# Patient Record
Sex: MALE | Race: WHITE
[De-identification: names, ages, dates, MRNs, and addresses within clinical notes are randomized per-mention and may not be internally consistent; named-entity substitution may affect disease eponyms.]

---

## 2019-04-24 ENCOUNTER — HOSPITAL ENCOUNTER (EMERGENCY)
Dept: HOSPITAL 75 - ER FS | Age: 64
Discharge: HOME | End: 2019-04-24
Payer: COMMERCIAL

## 2019-04-24 VITALS — DIASTOLIC BLOOD PRESSURE: 76 MMHG | SYSTOLIC BLOOD PRESSURE: 117 MMHG

## 2019-04-24 VITALS — WEIGHT: 220 LBS | BODY MASS INDEX: 28.23 KG/M2 | HEIGHT: 74 IN

## 2019-04-24 DIAGNOSIS — R07.89: Primary | ICD-10-CM

## 2019-04-24 DIAGNOSIS — Z82.49: ICD-10-CM

## 2019-04-24 DIAGNOSIS — E78.00: ICD-10-CM

## 2019-04-24 DIAGNOSIS — I10: ICD-10-CM

## 2019-04-24 LAB
ALBUMIN SERPL-MCNC: 4.2 GM/DL (ref 3.2–4.5)
ALP SERPL-CCNC: 73 U/L (ref 40–136)
ALT SERPL-CCNC: 13 U/L (ref 0–55)
BILIRUB SERPL-MCNC: 0.3 MG/DL (ref 0.1–1)
BUN/CREAT SERPL: 22
CALCIUM SERPL-MCNC: 9.1 MG/DL (ref 8.5–10.1)
CHLORIDE SERPL-SCNC: 100 MMOL/L (ref 98–107)
CO2 SERPL-SCNC: 20 MMOL/L (ref 21–32)
CREAT SERPL-MCNC: 1.02 MG/DL (ref 0.6–1.3)
ERYTHROCYTE [DISTWIDTH] IN BLOOD BY AUTOMATED COUNT: 13.2 % (ref 10–14.5)
GFR SERPLBLD BASED ON 1.73 SQ M-ARVRAT: > 60 ML/MIN
GLUCOSE SERPL-MCNC: 115 MG/DL (ref 70–105)
HCT VFR BLD CALC: 40 % (ref 40–54)
HGB BLD-MCNC: 13.6 G/DL (ref 13.3–17.7)
MCH RBC QN AUTO: 30 PG (ref 25–34)
MCHC RBC AUTO-ENTMCNC: 34 G/DL (ref 32–36)
MCV RBC AUTO: 89 FL (ref 80–99)
PLATELET # BLD: 219 10^3/UL (ref 130–400)
PMV BLD AUTO: 10.1 FL (ref 7.4–10.4)
POTASSIUM SERPL-SCNC: 4 MMOL/L (ref 3.6–5)
PROT SERPL-MCNC: 6.8 GM/DL (ref 6.4–8.2)
SODIUM SERPL-SCNC: 137 MMOL/L (ref 135–145)
WBC # BLD AUTO: 7.4 10^3/UL (ref 4.3–11)

## 2019-04-24 PROCEDURE — 76775 US EXAM ABDO BACK WALL LIM: CPT

## 2019-04-24 PROCEDURE — 93041 RHYTHM ECG TRACING: CPT

## 2019-04-24 PROCEDURE — 71045 X-RAY EXAM CHEST 1 VIEW: CPT

## 2019-04-24 PROCEDURE — 85027 COMPLETE CBC AUTOMATED: CPT

## 2019-04-24 PROCEDURE — 93005 ELECTROCARDIOGRAM TRACING: CPT

## 2019-04-24 PROCEDURE — 36415 COLL VENOUS BLD VENIPUNCTURE: CPT

## 2019-04-24 PROCEDURE — 80053 COMPREHEN METABOLIC PANEL: CPT

## 2019-04-24 PROCEDURE — 84484 ASSAY OF TROPONIN QUANT: CPT

## 2019-04-24 NOTE — DIAGNOSTIC IMAGING REPORT
INDICATION: Chest pain.



No prior examinations are available for comparison.



FINDINGS: The heart size, mediastinal configuration, and

pulmonary vascularity are within normal limits. There is no

pleural effusion, pneumothorax, or pneumonia. The osseous

structures are unremarkable. 



IMPRESSION: No acute cardiopulmonary abnormality.



Dictated by: 



  Dictated on workstation # [id number]

## 2019-04-24 NOTE — ED CHEST PAIN
General


Chief Complaint:  Chest Pain


Stated Complaint:  CHEST PAIN


Nursing Triage Note:  


Patient states he woke at 0600 today with substernal/epigastric chest pain that 


waxes and wanes, but never completely goes away.


Nursing Sepsis Screen:  No Definite Risk





History of Present Illness


Date Seen by Provider:  Apr 24, 2019


Time Seen by Provider:  08:30


This is a 63-year-old man with a history of hypertension although not currently 

taking antihypertensives, here for substernal nonradiating chest discomfort 

occurring since waking at 6 AM today. It is waxing and waning. Nonexertional. 

Mild to moderate severity at worst, 4 out of 10 in severity states. He tried to 

take medicine for heartburn including Tums, this did not help. He has felt the 

need to belch, hoping that in doing so the discomfort would go away however it 

persisted. He decided to go to work because it was not particularly concerning 

but because it kept coming back he decided to come for an evaluation. He had a 

negative stress test but this was many years ago. His father had heart disease 

diagnosed in his late 50s but he was also a smoker, otherwise no first-degree 

relatives with premature CAD or CVA. Patient is not a smoker, no diabetes, no 

drug use. No paresthesias in the extremities. No shortness of breath. No 

lightheadedness. No nausea or diaphoresis.





Allergies and Home Medications


Allergies


Coded Allergies:  


     No Known Drug Allergies (Unverified , 4/24/19)





Patient Home Medication List


Home Medication List Reviewed:  Yes





Review of Systems


Review of Systems


Constitutional:  no symptoms reported


EENTM:  No Symptoms Reported


Respiratory:  No Symptoms Reported


Cardiovascular:  See HPI


Gastrointestinal:  See HPI


Genitourinary:  No Symptoms Reported


Musculoskeletal:  no symptoms reported


Skin:  no symptoms reported


Psychiatric/Neurological:  No Symptoms Reported


Endocrine:  No Symptoms Reported


Hematologic/Lymphatic:  No Symptoms Reported





Past Medical-Social-Family Hx


Past Med/Social Hx:  Reviewed Nursing Past Med/Soc Hx


Patient Social History


Alcohol Use:  Occasionally Uses


Recreational Drug Use:  No


Smoking Status:  Never a Smoker


2nd Hand Smoke Exposure:  No


Recent Foreign Travel:  No


Contact w/Someone Who Travel:  No


Recent Infectious Disease Expo:  No


Recent Hopitalizations:  No


Physical Abuse:  No


Sexual Abuse:  No


Mistreated:  No


Fear:  No





Seasonal Allergies


Seasonal Allergies:  No





Past Medical History


Surgeries:  Yes


Eye Surgery


Respiratory:  No


Cardiac:  Yes


High Cholesterol, Hypertension


Neurological:  No


Genitourinary:  No


Gastrointestinal:  No


Musculoskeletal:  No


Endocrine:  No


HEENT:  Yes (right eye)


Eye Injury


Psychosocial:  No


Integumentary:  No


Blood Disorders:  No





Physical Exam


Vital Signs





Vital Signs - First Documented




















Capillary Refill : Less Than 3 Seconds


Height, Weight, BMI


Height: 6'2.00"


Weight: 220lbs. oz. 99.333952bw;  BMI


Method:Stated


General Appearance:  No Apparent Distress


HEENT:  Moist Mucous Membranes, Other (wearing an eye patch over the right eye 

secondary to mild photophobia related to recent attempted lens transplant 

procedure)


Neck:  Supple


Respiratory:  Lungs Clear


Cardiovascular:  Regular Rate, Rhythm, Normal Peripheral Pulses


Gastrointestinal:  No Pulsatile Mass, Non Tender, Soft


Neurologic/Psychiatric:  Alert, Oriented x3, No Motor/Sensory Deficits, Normal 

Mood/Affect


Skin:  Warm/Dry





Progress/Results/Core Measures


Results/Orders


Lab Results





Laboratory Tests








Test


 4/24/19


08:20 4/24/19


11:00 Range/Units


 


 


White Blood Count


 7.4 


 


 4.3-11.0


10^3/uL


 


Red Blood Count


 4.52 


 


 4.35-5.85


10^6/uL


 


Hemoglobin 13.6   13.3-17.7  G/DL


 


Hematocrit 40   40-54  %


 


Mean Corpuscular Volume 89   80-99  FL


 


Mean Corpuscular Hemoglobin 30   25-34  PG


 


Mean Corpuscular Hemoglobin


Concent 34 


 


 32-36  G/DL





 


Red Cell Distribution Width 13.2   10.0-14.5  %


 


Platelet Count


 219 


 


 130-400


10^3/uL


 


Mean Platelet Volume 10.1   7.4-10.4  FL


 


Sodium Level 137   135-145  MMOL/L


 


Potassium Level 4.0   3.6-5.0  MMOL/L


 


Chloride Level 100     MMOL/L


 


Carbon Dioxide Level 20 L  21-32  MMOL/L


 


Anion Gap 17 H  5-14  MMOL/L


 


Blood Urea Nitrogen 22 H  7-18  MG/DL


 


Creatinine


 1.02 


 


 0.60-1.30


MG/DL


 


Estimat Glomerular Filtration


Rate > 60 


 


  





 


BUN/Creatinine Ratio 22    


 


Glucose Level 115 H    MG/DL


 


Calcium Level 9.1   8.5-10.1  MG/DL


 


Corrected Calcium 8.9   8.5-10.1  MG/DL


 


Total Bilirubin 0.3   0.1-1.0  MG/DL


 


Aspartate Amino Transf


(AST/SGOT) 12 


 


 5-34  U/L





 


Alanine Aminotransferase


(ALT/SGPT) 13 


 


 0-55  U/L





 


Alkaline Phosphatase 73     U/L


 


Troponin T 8  7  <=15  NG/L


 


Total Protein 6.8   6.4-8.2  GM/DL


 


Albumin 4.2   3.2-4.5  GM/DL








My Orders





Orders - VANDANA JACOBS T DO


Chest 1 View Ap/Pa Only (4/24/19 08:47)


Ekg Tracing (4/24/19 08:47)


Comprehensive Metabolic Panel (4/24/19 08:47)


O2 (4/24/19 08:47)


Monitor-Rhythm Ecg Trace Only (4/24/19 08:47)


Aspirin Chewable Tablet (Baby Aspirin Ch (4/24/19 09:00)


Nitroglycerin 0.4 Mg Btl 25's (Nitrostat (4/24/19 09:00)


Ed Iv/Invasive Line Start (4/24/19 08:47)


Cbc No Diff (4/24/19 08:47)


Troponin T (4/24/19 08:47)


Lidocaine 2% Viscous 15 Ml (Xylocaine Vi (4/24/19 09:45)


Antacid  Suspension (Mylanta  Suspension (4/24/19 09:45)


Us Aorta 81700 (4/24/19 09:54)


Troponin T (4/24/19 10:20)





Medications Given in ED





Current Medications








 Medications  Dose


 Ordered  Sig/Janice


 Route  Start Time


 Stop Time Status Last Admin


Dose Admin


 


 Al Hydrox/Mg


 Hydrox/Simethicone  30 ml  ONCE  ONCE


 PO  4/24/19 09:45


 4/24/19 09:46 DC 4/24/19 09:45


30 ML


 


 Aspirin  324 mg  ONCE  ONCE


 PO  4/24/19 09:00


 4/24/19 09:01 DC 4/24/19 08:54


324 MG


 


 Lidocaine HCl  30 ml  ONCE  ONCE


 PO  4/24/19 09:45


 4/24/19 09:46 DC 4/24/19 09:45


30 ML


 


 Nitroglycerin  0.4 mg  UD  PRN


 SL  4/24/19 09:00


    4/24/19 08:54


0.4 MG








Vital Signs/I&O











 4/24/19 4/24/19





 08:13 08:13


 


Temp 97.6 


 


Pulse 62 


 


Resp 18 


 


B/P (MAP) 132/76 (94) 


 


Pulse Ox 98 


 


O2 Delivery Room Air Room Air














Blood Pressure Mean:  94











Progress


Progress Note #1:  


Progress Note


This is a 63-year-old man with waxing and waning nonexertional nonradiating 

chest discomfort with no associated symptoms. Assuming initial troponin is 

negative his heart score is 3, including nonspecific T-wave flattening on ECG, 

age, father having heart disease before age of 65, and including a history of 

hypertension.  His discomfort he states resolved after arriving to the 

emergency department. We will continue to monitor. We will give a full dose of 

aspirin, he took 81 mg this morning. I have offered admission for monitoring 

and stress test as an inpatient, I also discussed the possibility of discharge 

assuming remaining workup is negative and patient remains asymptomatic, with 

prompt outpatient cardiology follow-up, and we will make a disposition decision 

using shared decision making.


Progress Note #2:  


Progress Note


I did an abdominal aortic ultrasound given the lower chest/epigastric location 

of patient's discomfort, despite her not being epigastric tenderness or 

palpable mass. Patient remained feeling better, improved with a GI cocktail 

which suggests GI etiology however I still feel it is reasonable that he follow 

up with cardiology for consideration of outpatient stress testing, I 

recommended that he call today or tomorrow for the soonest available 

appointment. He will return for any new or worsening symptoms.


EKG :  


Comment


0812: Sinus justine rate 59.  Nonspecific T-wave flattening in aVL, V2, V3.  

Delayed precordial R-wave progression.





Departure


Impression





 Primary Impression:  


 Chest pain


Disposition:  01 HOME, SELF-CARE


Condition:  Stable





Departure-Patient Inst.


Referrals:  


MARY LOU SQUIRES MD (PCP/Family)


Primary Care Physician








REE OLIVAS MD FACP FAC CCDS


Patient Instructions:  Chest Pain (DC)











VANDANA JACOBS DO Apr 24, 2019 08:58

## 2019-04-24 NOTE — DIAGNOSTIC IMAGING REPORT
Indication: Chest pain.



Study is limited due to overlying bowel gas. Proximal aorta is

2.2 x 2.2 cm. Mid aorta is 2.4 x 2.5 cm. Distal aorta is 2.0 x

2.2 cm. The iliacs were obscured by bowel gas.



Impression: No evidence of abdominal aortic aneurysm.



Dictated by: 



  Dictated on workstation # [id number]

## 2019-07-13 ENCOUNTER — HOSPITAL ENCOUNTER (EMERGENCY)
Dept: HOSPITAL 75 - ER FS | Age: 64
Discharge: HOME | End: 2019-07-13
Payer: COMMERCIAL

## 2019-07-13 VITALS — BODY MASS INDEX: 28.23 KG/M2 | WEIGHT: 220 LBS | HEIGHT: 74 IN

## 2019-07-13 VITALS — DIASTOLIC BLOOD PRESSURE: 80 MMHG | SYSTOLIC BLOOD PRESSURE: 114 MMHG

## 2019-07-13 DIAGNOSIS — E78.00: ICD-10-CM

## 2019-07-13 DIAGNOSIS — I10: ICD-10-CM

## 2019-07-13 DIAGNOSIS — R42: Primary | ICD-10-CM

## 2019-07-13 LAB
ALBUMIN SERPL-MCNC: 3.9 GM/DL (ref 3.2–4.5)
ALP SERPL-CCNC: 77 U/L (ref 40–136)
ALT SERPL-CCNC: 17 U/L (ref 0–55)
BASOPHILS # BLD AUTO: 0 10^3/UL (ref 0–0.1)
BASOPHILS NFR BLD AUTO: 0 % (ref 0–10)
BILIRUB SERPL-MCNC: 0.6 MG/DL (ref 0.1–1)
BUN/CREAT SERPL: 14
CALCIUM SERPL-MCNC: 9.3 MG/DL (ref 8.5–10.1)
CHLORIDE SERPL-SCNC: 102 MMOL/L (ref 98–107)
CO2 SERPL-SCNC: 26 MMOL/L (ref 21–32)
CREAT SERPL-MCNC: 1.2 MG/DL (ref 0.6–1.3)
EOSINOPHIL # BLD AUTO: 0.4 10^3/UL (ref 0–0.3)
EOSINOPHIL NFR BLD AUTO: 6 % (ref 0–10)
ERYTHROCYTE [DISTWIDTH] IN BLOOD BY AUTOMATED COUNT: 13.3 % (ref 10–14.5)
GFR SERPLBLD BASED ON 1.73 SQ M-ARVRAT: > 60 ML/MIN
GLUCOSE SERPL-MCNC: 163 MG/DL (ref 70–105)
HCT VFR BLD CALC: 42 % (ref 40–54)
HGB BLD-MCNC: 14.3 G/DL (ref 13.3–17.7)
LYMPHOCYTES # BLD AUTO: 1.2 X 10^3 (ref 1–4)
LYMPHOCYTES NFR BLD AUTO: 17 % (ref 12–44)
MAGNESIUM SERPL-MCNC: 1.9 MG/DL (ref 1.8–2.4)
MANUAL DIFFERENTIAL PERFORMED BLD QL: NO
MCH RBC QN AUTO: 30 PG (ref 25–34)
MCHC RBC AUTO-ENTMCNC: 34 G/DL (ref 32–36)
MCV RBC AUTO: 88 FL (ref 80–99)
MONOCYTES # BLD AUTO: 0.5 X 10^3 (ref 0–1)
MONOCYTES NFR BLD AUTO: 8 % (ref 0–12)
NEUTROPHILS # BLD AUTO: 4.8 X 10^3 (ref 1.8–7.8)
NEUTROPHILS NFR BLD AUTO: 69 % (ref 42–75)
PLATELET # BLD: 200 10^3/UL (ref 130–400)
PMV BLD AUTO: 9.5 FL (ref 7.4–10.4)
POTASSIUM SERPL-SCNC: 4.3 MMOL/L (ref 3.6–5)
PROT SERPL-MCNC: 6.4 GM/DL (ref 6.4–8.2)
SODIUM SERPL-SCNC: 140 MMOL/L (ref 135–145)
WBC # BLD AUTO: 7.1 10^3/UL (ref 4.3–11)

## 2019-07-13 PROCEDURE — 96374 THER/PROPH/DIAG INJ IV PUSH: CPT

## 2019-07-13 PROCEDURE — 70450 CT HEAD/BRAIN W/O DYE: CPT

## 2019-07-13 PROCEDURE — 36415 COLL VENOUS BLD VENIPUNCTURE: CPT

## 2019-07-13 PROCEDURE — 83735 ASSAY OF MAGNESIUM: CPT

## 2019-07-13 PROCEDURE — 70498 CT ANGIOGRAPHY NECK: CPT

## 2019-07-13 PROCEDURE — 84484 ASSAY OF TROPONIN QUANT: CPT

## 2019-07-13 PROCEDURE — 85025 COMPLETE CBC W/AUTO DIFF WBC: CPT

## 2019-07-13 PROCEDURE — 80053 COMPREHEN METABOLIC PANEL: CPT

## 2019-07-13 PROCEDURE — 70496 CT ANGIOGRAPHY HEAD: CPT

## 2019-07-13 PROCEDURE — 93005 ELECTROCARDIOGRAM TRACING: CPT

## 2019-07-13 NOTE — ED GENERAL
General


Chief Complaint:  Dizziness/Syncope


Stated Complaint:  DIZZINESS


Nursing Triage Note:  


Patient reports that he was laying in bed and blew his nose and then the room 


began to spin and he became very diaphoretic. Patient reports that he was 


nauseated and the dizziness is much worse with movement.


Nursing Sepsis Screen:  No Definite Risk


Source of Information:  Patient


Exam Limitations:  No Limitations





History of Present Illness


Date Seen by Provider:  Jul 13, 2019


Time Seen by Provider:  10:35


Initial Comments


Patient blew his nose while laying in bed and became very vertiginous (room was 

spinning).  He became extremely diaphoretic and nauseated.  Symptoms have impro

brandon somewhat.  This occurred 30 minutes ago.  He is still dizzy with any 

movement.  No chest pain.  No headache.





Allergies and Home Medications


Allergies


Coded Allergies:  


     No Known Drug Allergies (Unverified , 4/24/19)





Home Medications


Meclizine HCl 25 Mg Tablet, 25 MG PO QID PRN for DIZZINESS


   Prescribed by: GARY JUAN on 7/13/19 1326


Olmesartan/Hydrochlorothiazide 1 Each Tablet, 1 EACH PO DAILY, (Reported)





Patient Home Medication List


Home Medication List Reviewed:  Yes





Review of Systems


Review of Systems


Constitutional:  no symptoms reported


EENTM:  nose congestion


Respiratory:  no symptoms reported


Cardiovascular:  no symptoms reported


Musculoskeletal:  no symptoms reported


Psychiatric/Neurological:  See HPI; Denies Headache, Denies Numbness, Denies 

Paresthesia, Denies Seizure





All Other Systems Reviewed


Negative Unless Noted:  Yes





Past Medical-Social-Family Hx


Patient Social History


Alcohol Use:  Denies Use


Recreational Drug Use:  No


Smoking Status:  Never a Smoker


2nd Hand Smoke Exposure:  No


Recent Foreign Travel:  No


Contact w/Someone Who Travel:  No


Recent Infectious Disease Expo:  No


Recent Hopitalizations:  No


Physical Abuse:  No


Sexual Abuse:  No





Seasonal Allergies


Seasonal Allergies:  No





Past Medical History


Surgeries:  Yes


Eye Surgery


Respiratory:  No


Cardiac:  Yes


High Cholesterol, Hypertension


Neurological:  No


Genitourinary:  No


Gastrointestinal:  No


Musculoskeletal:  No


Endocrine:  No


HEENT:  Yes (right eye)


Eye Injury


Psychosocial:  No


Integumentary:  No


Blood Disorders:  No





Physical Exam


Vital Signs





Vital Signs - First Documented








 7/13/19





 10:23


 


Temp 95.6


 


Pulse 57


 


Resp 14


 


B/P (MAP) 124/78 (93)


 


Pulse Ox 97


 


O2 Delivery Room Air





Capillary Refill : Less Than 3 Seconds


Height, Weight, BMI


Height: 6'2.00"


Weight: 220lbs. oz. 99.903615wf;  BMI


Method:Stated


General Appearance:  No Apparent Distress, WD/WN


Eyes:  Right Eye Other (right pupil 5 mm and nonreactive (postsurgical).  Left 

pupil reactive)


HEENT:  Normal ENT Inspection, Pharynx Normal


Neck:  Full Range of Motion, Normal Inspection, Supple; No Carotid Bruit


Respiratory:  Lungs Clear, Normal Breath Sounds


Cardiovascular:  Regular Rate, Rhythm, No Edema


Gastrointestinal:  Normal Bowel Sounds, No Organomegaly


Extremity:  Normal Inspection, Normal Range of Motion


Neurologic/Psychiatric:  Alert, Oriented x3, No Motor/Sensory Deficits, Normal 

Mood/Affect; No Motor Weakness, No Sensory Deficit


Skin:  Normal Color, Damp





Progress/Results/Core Measures


Suspected Sepsis


Recent Fever Within 48 Hours:  No


Infection Criteria Present:  None


New/Unexplained  Altered Menta:  No


Sepsis Screen:  No Definite Risk


SIRS


Temperature:95.6 


Pulse: 57 


Respiratory Rate: 14


 


Laboratory Tests


7/13/19 10:20: White Blood Count 7.1


Blood Pressure 124 /78 


Mean: 93


 


Laboratory Tests


7/13/19 10:20: 


Creatinine 1.20, Platelet Count 200, Total Bilirubin 0.6








Results/Orders


Lab Results





Laboratory Tests








Test


 7/13/19


10:20 Range/Units


 


 


White Blood Count


 7.1 


 4.3-11.0


10^3/uL


 


Red Blood Count


 4.79 


 4.35-5.85


10^6/uL


 


Hemoglobin 14.3  13.3-17.7  G/DL


 


Hematocrit 42  40-54  %


 


Mean Corpuscular Volume 88  80-99  FL


 


Mean Corpuscular Hemoglobin 30  25-34  PG


 


Mean Corpuscular Hemoglobin


Concent 34 


 32-36  G/DL





 


Red Cell Distribution Width 13.3  10.0-14.5  %


 


Platelet Count


 200 


 130-400


10^3/uL


 


Mean Platelet Volume 9.5  7.4-10.4  FL


 


Neutrophils (%) (Auto) 69  42-75  %


 


Lymphocytes (%) (Auto) 17  12-44  %


 


Monocytes (%) (Auto) 8  0-12  %


 


Eosinophils (%) (Auto) 6  0-10  %


 


Basophils (%) (Auto) 0  0-10  %


 


Neutrophils # (Auto) 4.8  1.8-7.8  X 10^3


 


Lymphocytes # (Auto) 1.2  1.0-4.0  X 10^3


 


Monocytes # (Auto) 0.5  0.0-1.0  X 10^3


 


Eosinophils # (Auto)


 0.4 H


 0.0-0.3


10^3/uL


 


Basophils # (Auto)


 0.0 


 0.0-0.1


10^3/uL


 


Sodium Level 140  135-145  MMOL/L


 


Potassium Level 4.3  3.6-5.0  MMOL/L


 


Chloride Level 102    MMOL/L


 


Carbon Dioxide Level 26  21-32  MMOL/L


 


Anion Gap 12  5-14  MMOL/L


 


Blood Urea Nitrogen 17  7-18  MG/DL


 


Creatinine


 1.20 


 0.60-1.30


MG/DL


 


Estimat Glomerular Filtration


Rate > 60 


  





 


BUN/Creatinine Ratio 14   


 


Glucose Level 163 H   MG/DL


 


Calcium Level 9.3  8.5-10.1  MG/DL


 


Corrected Calcium 9.4  8.5-10.1  MG/DL


 


Magnesium Level 1.9  1.8-2.4  MG/DL


 


Total Bilirubin 0.6  0.1-1.0  MG/DL


 


Aspartate Amino Transf


(AST/SGOT) 18 


 5-34  U/L





 


Alanine Aminotransferase


(ALT/SGPT) 17 


 0-55  U/L





 


Alkaline Phosphatase 77    U/L


 


Troponin I < 0.30  <0.30  NG/ML


 


Total Protein 6.4  6.4-8.2  GM/DL


 


Albumin 3.9  3.2-4.5  GM/DL








My Orders





Orders - GARY JUAN MD


Cbc With Automated Diff (7/13/19 10:28)


Comprehensive Metabolic Panel (7/13/19 10:28)


Magnesium (7/13/19 10:28)


Ekg Tracing (7/13/19 10:28)


Troponin I (7/13/19 10:33)


Ondansetron Injection (Zofran Injectio (7/13/19 10:45)


Meclizine Tablet (Antivert Tablet) (7/13/19 10:45)


Ct Head Wo (7/13/19 10:33)


Ct Angio Head/Neck (7/13/19 11:06)


Iohexol Injection (Omnipaque 350 Mg/Ml 1 (7/13/19 11:15)


Received Contrast (Hold Metformin- Contr (7/13/19 11:15)


Sodium Chloride Flush (Catheter Flush Sy (7/13/19 11:15)


Ns (Ivpb) (Sodium Chloride 0.9% Ivpb Bag (7/13/19 11:15)





Medications Given in ED





Current Medications








 Medications  Dose


 Ordered  Sig/Janice


 Route  Start Time


 Stop Time Status Last Admin


Dose Admin


 


 Iohexol  100 ml  ONCE  ONCE


 IV  7/13/19 11:15


 7/13/19 11:16 DC 7/13/19 12:10


75 ML


 


 Meclizine HCl  25 mg  ONCE  ONCE


 PO  7/13/19 10:45


 7/13/19 10:46 DC 7/13/19 11:18


25 MG


 


 Ondansetron HCl  4 mg  ONCE  ONCE


 IVP  7/13/19 10:45


 7/13/19 10:46 DC 7/13/19 11:00


4 MG


 


 Sodium Chloride  10 ml  AS NEEDED  PRN


 IV  7/13/19 11:15


 7/13/19 13:45 DC 7/13/19 12:10


10 ML


 


 Sodium Chloride  100 ml  ONCE  ONCE


 IV  7/13/19 11:15


 7/13/19 11:16 DC 7/13/19 12:10


80 ML








Vital Signs/I&O











 7/13/19 7/13/19





 10:23 13:33


 


Temp 95.6 


 


Pulse 57 59


 


Resp 14 19


 


B/P (MAP) 124/78 (93) 114/80 (91)


 


Pulse Ox 97 95


 


O2 Delivery Room Air 





Capillary Refill : Less Than 3 Seconds








Blood Pressure Mean:                    93








Progress Note :  


   Time:  13:23


Progress Note


Test results discussed the patient.  He feels much better.  Currently 

asymptomatic.  CTA of head and neck was normal.  Suspect peripheral vertigo.  

Prescribe Antivert





ECG


Initial ECG Impression Time:  10:39


Initial ECG Rhythm:  S.Aden


Initial ECG Intervals:  Normal


Initial ECG Impression:  Normal





Departure


Impression





   Primary Impression:  


   Vertigo


Disposition:  01 HOME, SELF-CARE


Condition:  Stable





Departure-Patient Inst.


Decision time for Depature:  13:24


Referrals:  


MARY LOU SQUIRES MD (PCP/Family)


Primary Care Physician


Patient Instructions:  Vertigo (a Type of Dizziness) (DC)





Add. Discharge Instructions:  


Rest today and tomorrow.  No sudden movements.  Meclizine for symptoms.  See 

your doctor next week for follow-up.  All discharge instructions reviewed with 

patient and/or family. Voiced understanding.


Scripts


Meclizine HCl (Meclizine HCl) 25 Mg Tablet


25 MG PO QID PRN for DIZZINESS, #20 TAB


   Prov: GARY JUAN MD         7/13/19











GARY JUAN MD              Jul 13, 2019 10:40

## 2019-07-13 NOTE — NUR
Patient up to bedside commode at this time. Patient transferred self with stand 
by assist. denies nausea or dizziness at this time.

## 2019-07-13 NOTE — DIAGNOSTIC IMAGING REPORT
PROCEDURE: CT angiography of the head and CT angiography of the

neck with and without contrast.



TECHNIQUE: Contiguous noncontrast images were obtained from the

skull base through the vertex. After intravenous contrast

administration, helical CT angiography of the neck was performed.

Source data was reformatted into multiple MIP projections.

Delayed post contrast acquisition was also obtained. Auto

Exposure Controls were utilized during the CT exam to meet ALARA

standards for radiation dose reduction. 



INDICATION: Dizziness. Diaphoretic. Nausea.



FINDINGS: There are normal origins of the brachiocephalic

vessels. The carotid arteries are widely patent bilaterally with

no significant stenosis or vessel irregularity is seen. No

dissection is evident. The vertebral arteries are patent with no

stenosis or dissection evident.



The distal internal carotid and vertebral arteries are patent as

is the basilar artery. There is no evidence for aneurysm, AVM,

neovascularity or major vessel occlusion.



Ventricles are normal in size, shape and position. There is no

acute parenchymal hemorrhage, edema or mass. There is no

extra-axial mass or hemorrhage. There is no abnormal enhancement.



IMPRESSION: CT angiography of the head and neck shows no

significant abnormality with no hemodynamically significant

stenoses seen. There are no dissections. No occlusions are seen.



Dictated by: 



  Dictated on workstation # [id number]

## 2019-07-13 NOTE — DIAGNOSTIC IMAGING REPORT
PROCEDURE: CT head without contrast.



TECHNIQUE: Multiple contiguous axial images were obtained through

the brain without the use of intravenous contrast. Auto Exposure

Controls were utilized during the CT exam to meet ALARA standards

for radiation dose reduction. 



INDICATION: Vertigo, nausea, dizziness, diaphoresis.



COMPARISON: None.



FINDINGS:



The ventricles and cortical sulci appear age-appropriate. There

is no midline shift or mass effect. No acute intracranial

hemorrhage is seen. There is no CT evidence of acute territorial

ischemia. The calvarium appears intact. There is mucosal

thickening in the bilateral ethmoid sinuses with a small fluid

level in the left maxillary sinus; however, the paranasal sinuses

are incompletely included on this exam.



IMPRESSION:

1. No acute intracranial hemorrhage or CT evidence of acute

territorial ischemia.

2. Mucosal thickening and fluid level in the paranasal sinuses,

may be due to sinusitis.



Dictated by: 



  Dictated on workstation # [id number]

## 2019-07-13 NOTE — XMS REPORT
Continuity of Care Document

                             Created on: 2019



JED BRAGG

External Reference #: [id number]

: 1955

Sex: Male



Demographics







                          Address                   [address] [place] [address]  [id number]

 

                          Home Phone                [phone] x

 

                          Preferred Language        Unknown

 

                          Marital Status            Unknown

 

                          Yarsanism Affiliation     Unknown

 

                          Race                      Unknown

 

                          Ethnic Group              Unknown





Author







                          Organization              Unknown

 

                          Address                   Unknown

 

                          Phone                     [phone]



              



Allergies

      





             Active              Description              Code              Type              Severity

                Reaction              Onset              Reported/Identified              Relationship

 to Patient                             Clinical Status        

 

                Yes              No Known Drug Allergies              [id number]              Drug Allergy

              Unknown              N/A                             2019              

                                                 



                  



Medications

      



There is no data.                  



Problems

      





             Date Dx Coded              Attending              Type              Code              Diagnosis

                                        Diagnosed By        

 

                2019              REYNALDO BINGHAM, VANDANA T              Ot              E78.00        

                          PURE HYPERCHOLESTEROLEMIA, UNSPECIFIED                       

 

                2019              REYNALDO BINGHAM, VANDANA T              Ot              I10           

                          ESSENTIAL (PRIMARY) HYPERTENSION                       

 

                2019              REYNALDO BINGHAM, VANDANA T              Ot              R07.81        

                          PLEURODYNIA                        

 

                2019              REYNALDO BINGHAM, VANDANA T              Ot              R07.89        

                          OTHER CHEST PAIN                       

 

                2019              REYNALDO BINGHAM, VANDANA T              Ot              Z82.49        

                          FAMILY HX OF ISCHEM HEART DIS AND OTH DI                       

 

                2019              REYNALDO BINGHAM, VANDANA T              Ot              E78.00        

                          PURE HYPERCHOLESTEROLEMIA, UNSPECIFIED                       

 

                2019              REYNALDO BINGHAM, VANDANA T              Ot              I10           

                          ESSENTIAL (PRIMARY) HYPERTENSION                       

 

                2019              REYNALDO BINGHAM, VANDANA T              Ot              R07.81        

                          PLEURODYNIA                        

 

                2019              REYNALDO BINGHAM, VANDANA T              Ot              R07.89        

                          OTHER CHEST PAIN                       

 

                2019              REYNALDO BINGHAM, VANDANA T              Ot              Z82.49        

                          FAMILY HX OF ISCHEM HEART DIS AND OTH DI                       

 

                2019              REYNALDO BINGHAM, VANDANA T              Ot              E78.00        

                          PURE HYPERCHOLESTEROLEMIA, UNSPECIFIED                       

 

                2019              REYNALDO BINGHAM, VANDANA T              Ot              I10           

                          ESSENTIAL (PRIMARY) HYPERTENSION                       

 

                2019              REYNALDO BINGHAM, VANDANA T              Ot              R07.81        

                          PLEURODYNIA                        

 

                2019              REYNALDO BINGHAM, VANDANA T              Ot              R07.89        

                          OTHER CHEST PAIN                       

 

                2019              REYNALDO BINGHAM, VANDANA T              Ot              Z82.49        

                          FAMILY HX OF ISCHEM HEART DIS AND OTH DI                       



                                              



Procedures

      



There is no data.                  



Results

      





                    Test                Result              Range        









                                        Automated blood complete blood count (hemogram) panel - 19 08:20         









                          Blood leukocytes automated count (number/volume)              7.4 10*3/uL         

                                        4.3-11.0        

 

                          Blood erythrocytes automated count (number/volume)              4.52 10*6/uL      

                                        4.35-5.85        

 

                          Venous blood hemoglobin measurement (mass/volume)              13.6 g/dL          

                                        13.3-17.7        

 

                    Blood hematocrit (volume fraction)              40 %                40-54        

 

                    Automated erythrocyte mean corpuscular volume              89 [foz_us]              

80-99        

 

                                        Automated erythrocyte mean corpuscular hemoglobin (mass per erythrocyte)        

                          30 pg                     25-34        

 

                                        Automated erythrocyte mean corpuscular hemoglobin concentration measurement (mass/volume)

                          34 g/dL                   32-36        

 

                    Automated erythrocyte distribution width ratio              13.2 %              10.0-

14.5        

 

                    Automated blood platelet count (count/volume)              219 10*3/uL              

130-400        

 

                          Automated blood platelet mean volume measurement              10.1 [foz_us]       

                                        7.4-10.4        









                                        Comprehensive metabolic panel - 19 08:20         









                          Serum or plasma sodium measurement (moles/volume)              137 mmol/L         

                                        135-145        

 

                          Serum or plasma potassium measurement (moles/volume)              4.0 mmol/L      

                                        3.6-5.0        

 

                          Serum or plasma chloride measurement (moles/volume)              100 mmol/L       

                                                

 

                    Carbon dioxide              20 mmol/L              21-32        

 

                          Serum or plasma anion gap determination (moles/volume)              17 mmol/L     

                                        5-14        

 

                          Serum or plasma urea nitrogen measurement (mass/volume)              22 mg/dL     

                                        7-18        

 

                          Serum or plasma creatinine measurement (mass/volume)              1.02 mg/dL      

                                        0.60-1.30        

 

                    Serum or plasma urea nitrogen/creatinine mass ratio              22                  NRG

        

 

                                        Serum or plasma creatinine measurement with calculation of estimated glomerular 

filtration rate              >                         NRG        

 

                          Serum or plasma glucose measurement (mass/volume)              115 mg/dL          

                                                

 

                          Serum or plasma calcium measurement (mass/volume)              9.1 mg/dL          

                                        8.5-10.1        

 

                          Serum or plasma total bilirubin measurement (mass/volume)              0.3 mg/dL  

                                        0.1-1.0        

 

                                        Serum or plasma alkaline phosphatase measurement (enzymatic activity/volume)    

                          73 U/L                            

 

                                        Serum or plasma aspartate aminotransferase measurement (enzymatic activity/volume)

                          12 U/L                    5-34        

 

                                        Serum or plasma alanine aminotransferase measurement (enzymatic activity/volume)

                          13 U/L                    0-55        

 

                          Serum or plasma protein measurement (mass/volume)              6.8 g/dL           

                                        6.4-8.2        

 

                          Serum or plasma albumin measurement (mass/volume)              4.2 g/dL           

                                        3.2-4.5        

 

                    CALCIUM CORRECTED              8.9 mg/dL              8.5-10.1        









                                        TROPONIN T - 19 08:20         









                    TROPONIN T              8 %                 <=15        









                                        TROPONIN T - 19 11:00         









                    TROPONIN T              7 %                 <=15        



                      



Encounters

      





                ACCT No.              Visit Date/Time              Discharge              Status      

                Pt. Type              Provider              Facility              Loc./Unit      

                                        Complaint        

 

                    [id number]              2019 08:11:00              2019 11:50:00      

                [facility]              Emergency              VANDANA JACOBS DO              [facility]              ER FS                     CHEST PAIN

## 2021-11-30 ENCOUNTER — HOSPITAL ENCOUNTER (OUTPATIENT)
Dept: HOSPITAL 75 - PREOP | Age: 66
Discharge: HOME | End: 2021-11-30
Attending: SURGERY
Payer: MEDICARE

## 2021-11-30 VITALS — BODY MASS INDEX: 31.69 KG/M2 | HEIGHT: 74.02 IN | WEIGHT: 246.92 LBS

## 2021-11-30 DIAGNOSIS — Z01.818: Primary | ICD-10-CM

## 2021-12-03 ENCOUNTER — HOSPITAL ENCOUNTER (OUTPATIENT)
Dept: HOSPITAL 75 - LAB FS | Age: 66
End: 2021-12-03
Attending: SURGERY
Payer: MEDICARE

## 2021-12-03 DIAGNOSIS — R13.10: ICD-10-CM

## 2021-12-03 DIAGNOSIS — Z01.812: Primary | ICD-10-CM

## 2021-12-03 DIAGNOSIS — Z20.822: ICD-10-CM

## 2021-12-03 PROCEDURE — 87635 SARS-COV-2 COVID-19 AMP PRB: CPT

## 2021-12-06 ENCOUNTER — HOSPITAL ENCOUNTER (OUTPATIENT)
Dept: HOSPITAL 75 - ENDO | Age: 66
Discharge: HOME | End: 2021-12-06
Attending: SURGERY
Payer: COMMERCIAL

## 2021-12-06 VITALS — WEIGHT: 246.92 LBS | HEIGHT: 74.02 IN | BODY MASS INDEX: 31.69 KG/M2

## 2021-12-06 VITALS — SYSTOLIC BLOOD PRESSURE: 105 MMHG | DIASTOLIC BLOOD PRESSURE: 64 MMHG

## 2021-12-06 VITALS — SYSTOLIC BLOOD PRESSURE: 133 MMHG | DIASTOLIC BLOOD PRESSURE: 87 MMHG

## 2021-12-06 VITALS — DIASTOLIC BLOOD PRESSURE: 55 MMHG | SYSTOLIC BLOOD PRESSURE: 103 MMHG

## 2021-12-06 VITALS — SYSTOLIC BLOOD PRESSURE: 115 MMHG | DIASTOLIC BLOOD PRESSURE: 58 MMHG

## 2021-12-06 VITALS — DIASTOLIC BLOOD PRESSURE: 58 MMHG | SYSTOLIC BLOOD PRESSURE: 115 MMHG

## 2021-12-06 DIAGNOSIS — K44.9: ICD-10-CM

## 2021-12-06 DIAGNOSIS — K20.90: ICD-10-CM

## 2021-12-06 DIAGNOSIS — K29.50: Primary | ICD-10-CM

## 2021-12-06 DIAGNOSIS — I10: ICD-10-CM

## 2021-12-06 DIAGNOSIS — E66.9: ICD-10-CM

## 2021-12-06 DIAGNOSIS — R05.9: ICD-10-CM

## 2021-12-06 DIAGNOSIS — E78.5: ICD-10-CM

## 2021-12-06 DIAGNOSIS — Z79.899: ICD-10-CM

## 2021-12-06 PROCEDURE — 88305 TISSUE EXAM BY PATHOLOGIST: CPT

## 2021-12-06 NOTE — ANESTHESIA-GENERAL POST-OP
MAC


Patient Condition


Mental Status/LOC:  Same as Preop


Cardiovascular:  Satisfactory


Nausea/Vomiting:  Absent


Respiratory:  Satisfactory


Pain:  Controlled


Complications:  Absent





Post Op Complications


Complications


None





Follow Up Care/Instructions


Patient Instructions


None needed.





Anesthesiology Discharge Order


Discharge Order


Patient was seen after the procedure and he was doing well, no complaints, 

stable vital signs, no apparent adverse anesthesia problems.











DANTE COLLADO DO          Dec 6, 2021 09:34

## 2021-12-06 NOTE — PROGRESS NOTE-POST OPERATIVE
Post-Operative Progess Note


Surgeon (s)/Assistant (s)


Surgeon


ALDO NORTH DO


Assistant:  none





Pre-Operative Diagnosis


Cough, Dysphagia





Post-Operative Diagnosis





Gastritis


Hiatal Hernia





Procedure & Operative Findings


Date of Procedure


12/6/21


Procedure Performed/Findings


EGD with bx





PROCEDURE NOTE:


After informed consent was obtained, the patient was brought to the endoscopy


suite, placed in bed in left lateral decubitus position.  He was administered


IV sedation by the CRNA who then monitored  vitals the entire time, heart


rate, blood pressure and pulse ox and the scope was inserted down the mouth


through the esophagus into the stomach.  On the way down, noted some mild


esophagitis and pushed into the stomach, pushed past the antrum


into the duodenum.  Duodenum looked good.  Pulled back and did a biopsy of


antrum, then retroflexed the scope, saw small hiatal hernia, took a picture of 

this


and then pulled the scope into the GE junction.  Then did a biopsy of the GE 

junction.  


Pushed the scope back into the stomach, suctioned all the air out of the 

stomach. 


At this point pulled the scope up the esophagus and out the mouth. 





The patient tolerated the procedure, and he recovered in endoscopy suite.


Anesthesia Type


IV sedation by CRNA





Estimated Blood Loss


Estimated blood loss (mL):  scant





Specimens/Packing


Specimens Removed


antral bx


body of stomach bx


GE jxn bx











ALDO NORTH DO                Dec 6, 2021 08:46

## 2022-10-21 ENCOUNTER — HOSPITAL ENCOUNTER (OUTPATIENT)
Dept: HOSPITAL 75 - RAD FS | Age: 67
End: 2022-10-21
Attending: FAMILY MEDICINE
Payer: MEDICARE

## 2022-10-21 DIAGNOSIS — K57.30: Primary | ICD-10-CM

## 2022-10-21 PROCEDURE — 74176 CT ABD & PELVIS W/O CONTRAST: CPT

## 2022-10-21 NOTE — DIAGNOSTIC IMAGING REPORT
PROCEDURE: CT abdomen and pelvis without contrast.



TECHNIQUE: Multiple contiguous axial images were obtained through

the abdomen and pelvis without the use of intravenous contrast.

Auto Exposure Controls were utilized during the CT exam to meet

ALARA standards for radiation dose reduction. 



INDICATION:  Left lower quadrant pain and left flank pain for 3

weeks.



No prior studies are available for comparison.



The lung bases are clear. There is a low density but

solid-appearing mass in the right lobe of the liver measuring

approximately 6.8 cm. No other liver lesions are seen.

Gallbladder is unremarkable. There is no biliary ductal

dilatation. Pancreas and spleen are unremarkable. No adrenal mass

is detected. No renal calculi or hydronephrosis is detected.

Aorta is nonaneurysmal. The bowel loops are normal caliber.

Occasional diverticula within the sigmoid are noted but no

evidence of acute diverticulitis. No free fluid or fluid

collection is seen. Bladder is decompressed. Prostate is

unremarkable. There is a fat-containing left inguinal hernia.



IMPRESSION:

1. Solid appearing right lobe liver mass, indeterminate.

Multiphase CT or liver MRI would be recommended for further

characterization. 

2. No evidence of urinary tract calculi or obstruction. 

3. Uncomplicated diverticulosis.



Dictated by: 



  Dictated on workstation # [id number]

## 2022-10-31 ENCOUNTER — HOSPITAL ENCOUNTER (OUTPATIENT)
Dept: HOSPITAL 75 - LAB FS | Age: 67
End: 2022-10-31
Attending: FAMILY MEDICINE
Payer: MEDICARE

## 2022-10-31 DIAGNOSIS — R93.5: ICD-10-CM

## 2022-10-31 DIAGNOSIS — I10: ICD-10-CM

## 2022-10-31 DIAGNOSIS — K76.89: Primary | ICD-10-CM

## 2022-10-31 LAB
ALBUMIN SERPL-MCNC: 4.2 GM/DL (ref 3.2–4.5)
ALP SERPL-CCNC: 112 U/L (ref 40–136)
ALT SERPL-CCNC: 16 U/L (ref 0–55)
BILIRUB SERPL-MCNC: 0.5 MG/DL (ref 0.1–1)
BUN/CREAT SERPL: 9
CALCIUM SERPL-MCNC: 9.2 MG/DL (ref 8.5–10.1)
CHLORIDE SERPL-SCNC: 103 MMOL/L (ref 98–107)
CO2 SERPL-SCNC: 26 MMOL/L (ref 21–32)
CREAT SERPL-MCNC: 1.14 MG/DL (ref 0.6–1.3)
GFR SERPLBLD BASED ON 1.73 SQ M-ARVRAT: 71 ML/MIN
GLUCOSE SERPL-MCNC: 127 MG/DL (ref 70–105)
POTASSIUM SERPL-SCNC: 4.2 MMOL/L (ref 3.6–5)
PROT SERPL-MCNC: 7.1 GM/DL (ref 6.4–8.2)
SODIUM SERPL-SCNC: 138 MMOL/L (ref 135–145)

## 2022-10-31 PROCEDURE — 80053 COMPREHEN METABOLIC PANEL: CPT

## 2022-10-31 PROCEDURE — 36415 COLL VENOUS BLD VENIPUNCTURE: CPT

## 2022-10-31 PROCEDURE — 74160 CT ABDOMEN W/CONTRAST: CPT

## 2022-10-31 NOTE — DIAGNOSTIC IMAGING REPORT
PROCEDURE: CT abdomen with contrast only.



TECHNIQUE: Multiple contiguous axial images were obtained through

the abdomen after the administration of intravenous contrast.

Auto Exposure Controls were utilized during the CT exam to meet

ALARA standards for radiation dose reduction. 



INDICATION:  Followup liver lesion.



COMPARISON:  CT abdomen and pelvis without contrast on

10/21/2022.

 

FINDINGS:  

The heart is unremarkable. The lung bases are clear.  



Stable size of the lesion within the right hepatic lobe. This

demonstrates nodular peripheral enhancement with filling on

delayed images, consistent with benign hemangioma. No other

suspicious liver lesions are identified. The portal vein is

patent. The gallbladder is unremarkable.



The spleen, pancreas, adrenal glands, and kidneys have a normal

appearance. There is no pathologically enlarged mesenteric or

retroperitoneal adenopathy. 



The included bowel loops are nondilated. There is no free fluid

or free air. 



No acute osseous abnormalities.



IMPRESSION: 

1. Findings consistent with benign hemangioma in the right

hepatic lobe. No other suspicious focal hepatic lesions are seen.

No further followup is recommended regarding this benign lesion.













Dictated by: 



  Dictated on workstation # [id number]

## 2023-01-24 ENCOUNTER — HOSPITAL ENCOUNTER (OUTPATIENT)
Dept: HOSPITAL 75 - CARDFS | Age: 68
End: 2023-01-24
Attending: FAMILY MEDICINE
Payer: MEDICARE

## 2023-01-24 DIAGNOSIS — I10: Primary | ICD-10-CM

## 2023-01-24 PROCEDURE — 93306 TTE W/DOPPLER COMPLETE: CPT

## 2023-04-11 ENCOUNTER — HOSPITAL ENCOUNTER (OUTPATIENT)
Dept: HOSPITAL 75 - CARD | Age: 68
End: 2023-04-11
Attending: NURSE PRACTITIONER
Payer: MEDICARE

## 2023-04-11 VITALS — WEIGHT: 249.12 LBS | BODY MASS INDEX: 32.31 KG/M2 | HEIGHT: 73.62 IN

## 2023-04-11 VITALS — DIASTOLIC BLOOD PRESSURE: 79 MMHG | SYSTOLIC BLOOD PRESSURE: 131 MMHG

## 2023-04-11 DIAGNOSIS — R06.09: Primary | ICD-10-CM

## 2023-04-11 PROCEDURE — 78452 HT MUSCLE IMAGE SPECT MULT: CPT

## 2023-04-11 PROCEDURE — 93017 CV STRESS TEST TRACING ONLY: CPT

## 2023-05-10 ENCOUNTER — HOSPITAL ENCOUNTER (OUTPATIENT)
Dept: HOSPITAL 75 - RAD | Age: 68
End: 2023-05-10
Attending: NURSE PRACTITIONER
Payer: MEDICARE

## 2023-05-10 DIAGNOSIS — R06.09: Primary | ICD-10-CM

## 2023-05-10 DIAGNOSIS — R06.02: ICD-10-CM

## 2023-05-10 LAB
BUN/CREAT SERPL: 11
CREAT SERPL-MCNC: 1.32 MG/DL (ref 0.6–1.3)
GFR SERPLBLD BASED ON 1.73 SQ M-ARVRAT: 59 ML/MIN

## 2023-05-10 PROCEDURE — 36415 COLL VENOUS BLD VENIPUNCTURE: CPT

## 2023-05-10 PROCEDURE — 94060 EVALUATION OF WHEEZING: CPT

## 2023-05-10 PROCEDURE — 94729 DIFFUSING CAPACITY: CPT

## 2023-05-10 PROCEDURE — 94726 PLETHYSMOGRAPHY LUNG VOLUMES: CPT

## 2023-05-10 PROCEDURE — 82565 ASSAY OF CREATININE: CPT

## 2023-05-10 PROCEDURE — 71275 CT ANGIOGRAPHY CHEST: CPT

## 2023-05-10 PROCEDURE — 84520 ASSAY OF UREA NITROGEN: CPT

## 2023-05-10 NOTE — DIAGNOSTIC IMAGING REPORT
PROCEDURE: 

CT angiography of the chest with contrast.



TECHNIQUE: 

Multiple contiguous axial images were obtained through the chest

after uneventful bolus administration of intravenous contrast. 3D

reconstructed CTA MIP acquisitions were also performed.

Auto Exposure Controls were utilized during the CT exam to meet

ALARA standards for radiation dose reduction. 

 

INDICATION:  

Shortness of air.



COMPARISON: 

No prior studies are available for comparison.



FINDINGS:

Evaluation of the pulmonary arterial system is without evidence

of thromboembolism. No filling defects are seen within central,

lobar, or segmental branches. The thoracic aorta is of normal

caliber. There is no pericardial or pleural fluid. There is some

linear atelectasis in the right lower lobe; otherwise, the lungs

are clear. No nodules or masses are seen. The upper abdomen again

demonstrates a right hepatic mass, previously shown to represent

a hemangioma. No other abnormalities are seen.



IMPRESSION: 

No evidence of pulmonary embolism. No acute feature in the chest

is identified.



Dictated by: 



  Dictated on workstation # [id number]